# Patient Record
Sex: MALE | Race: WHITE | NOT HISPANIC OR LATINO | Employment: FULL TIME
[De-identification: names, ages, dates, MRNs, and addresses within clinical notes are randomized per-mention and may not be internally consistent; named-entity substitution may affect disease eponyms.]

---

## 2022-01-26 ENCOUNTER — TRANSCRIBE ORDERS (OUTPATIENT)
Dept: OTHER | Age: 47
End: 2022-01-26

## 2022-01-26 ENCOUNTER — TELEPHONE (OUTPATIENT)
Dept: DERMATOLOGY | Facility: CLINIC | Age: 47
End: 2022-01-26

## 2022-01-26 DIAGNOSIS — C43.9 MELANOMA OF SKIN (H): Primary | ICD-10-CM

## 2022-01-26 NOTE — TELEPHONE ENCOUNTER
Called patient to schedule consult and MOHS surgery for Melanoma on Right Mid Helix. Patient said he is going to call [employer] and see if we are in network before he schedules anything. Said he will callback today or tomorrow.    Nyasia Ocampo, Procedure

## 2022-01-31 ENCOUNTER — TELEPHONE (OUTPATIENT)
Dept: DERMATOLOGY | Facility: CLINIC | Age: 47
End: 2022-01-31
Payer: COMMERCIAL

## 2022-01-31 NOTE — TELEPHONE ENCOUNTER
Called patient to schedule consult and MOHS surgery for Melanoma on Right Mid [place]. Left voicemail to see if he had his insurance figured out yet.      Nyasia Ocampo, Procedure

## 2022-02-11 NOTE — TELEPHONE ENCOUNTER
FUTURE VISIT INFORMATION      FUTURE VISIT INFORMATION:    Date: 2.28.22    Time: 3:00    Location: Telephone  REFERRAL INFORMATION:    Referring provider:  Dr. Wayne Wilson    Referring providers clinic:  [facility]    Reason for visit/diagnosis  MOHS: Melanoma on Right Mid [place] [phone]    RECORDS REQUESTED FROM:       Clinic name Comments Records Status Photos Status   [facility] Derm 1.12.22  Dr. Wilson  Path # [id number] CE Received

## 2022-02-19 ENCOUNTER — HEALTH MAINTENANCE LETTER (OUTPATIENT)
Age: 47
End: 2022-02-19

## 2022-02-28 ENCOUNTER — PRE VISIT (OUTPATIENT)
Dept: DERMATOLOGY | Facility: CLINIC | Age: 47
End: 2022-02-28

## 2022-02-28 ENCOUNTER — VIRTUAL VISIT (OUTPATIENT)
Dept: DERMATOLOGY | Facility: CLINIC | Age: 47
End: 2022-02-28
Payer: COMMERCIAL

## 2022-02-28 DIAGNOSIS — C43.21 MALIGNANT MELANOMA OF HELIX OF RIGHT EAR (H): Primary | ICD-10-CM

## 2022-02-28 PROCEDURE — 99213 OFFICE O/P EST LOW 20 MIN: CPT | Mod: 95 | Performed by: DERMATOLOGY

## 2022-02-28 ASSESSMENT — PAIN SCALES - GENERAL: PAINLEVEL: NO PAIN (0)

## 2022-02-28 NOTE — PROGRESS NOTES
Mohs Micrographic Surgery Consult Note    Feb 28, 2022  Start time (if telephone encounter): 3:00 p.m.  End time (if telephone encounter): 3:10 p.m.    Dermatology Problem List:  1. Melanoma in situ, right mid helix, s/p shave bx 1/12/2022    Subjective: The patient is a 46 year old man who presents today for Mohs micrographic surgery consultation for a recent diagnosis of skin cancer.    Skin cancer(s): Melanoma in situ  Location(s): right mid helix  Associated symptoms: pruritus, tenderness to touch  Onset: within last 1 year    No other associated symptoms, modifying factors, or prior treatments, except when noted above. The patient denies any constitutional symptoms, lymphadenopathy, unintentional weight loss or decreased appetite. No other skin concerns today.    Objective:   Skin: No photos    Assessment and Plan:     1. Plan for Mohs micrographic surgery for skin cancer(s) above:  - We discussed the nature of the diagnosis/condition above. We discussed the treatment options, including the risks benefits and expectations of these options. We recommend micrographic surgery as the most effective and most tissue sparing option for treatment, and the patient agrees to proceed with this.  The patient is aware of the risks, benefits and expectations of this procedure. The patient will be scheduled for this procedure, if not already done so.  - We anticipate the following closure type: Sliding or lifting flap    The patient was discussed with and evaluated by attending physician, Laron Ya MD.    Deyvi Johns MD  Micrographic Surgery and Dermatologic Oncology (MSDO) Fellow    Scribe Disclosure:  I, Diogo Arias, am serving as a scribe to document services personally performed by Laron Ya MD based on data collection and the provider's statements to me. \    Attending Attestation  I attest that I discussed the case with the Fellow.  I agree with the plan.   I have reviewed the note and edited it as  necessary.    Laron Ya M.D.  Professor  Director of Dermatologic Surgery  Department of Dermatology  [facility]

## 2022-02-28 NOTE — LETTER
2/28/2022       RE: Ori Betancourt  [address]  [place] [address]     Dear Colleague,    Thank you for referring your patient, Ori Betancourt, to the [facility]ERMAT[facility] [place] [facility]. Please see a copy of my visit note below.    Mohs Micrographic Surgery Consult Note    Feb 28, 2022  Start time (if telephone encounter): 3:00 p.m.  End time (if telephone encounter): 3:10 p.m.    Dermatology Problem List:  1. Melanoma in situ, right mid helix, s/p shave bx 1/12/2022    Subjective: The patient is a 46 year old man who presents today for Mohs micrographic surgery consultation for a recent diagnosis of skin cancer.    Skin cancer(s): Melanoma in situ  Location(s): right mid helix  Associated symptoms: pruritus, tenderness to touch  Onset: within last 1 year    No other associated symptoms, modifying factors, or prior treatments, except when noted above. The patient denies any constitutional symptoms, lymphadenopathy, unintentional weight loss or decreased appetite. No other skin concerns today.    Objective:   Skin: No photos    Assessment and Plan:     1. Plan for Mohs micrographic surgery for skin cancer(s) above:  - We discussed the nature of the diagnosis/condition above. We discussed the treatment options, including the risks benefits and expectations of these options. We recommend micrographic surgery as the most effective and most tissue sparing option for treatment, and the patient agrees to proceed with this.  The patient is aware of the risks, benefits and expectations of this procedure. The patient will be scheduled for this procedure, if not already done so.  - We anticipate the following closure type: Sliding or lifting flap    The patient was discussed with and evaluated by attending physician, Laron Ya MD.    Deyvi Johns MD  Micrographic Surgery and Dermatologic Oncology (MSDO) Fellow    Scribe Disclosure:  Diogo WELCH  Juana, am serving as a scribe to document services personally performed by Laron Ya MD based on data collection and the provider's statements to me. \    Attending Attestation  I attest that I discussed the case with the Fellow.  I agree with the plan.   I have reviewed the note and edited it as necessary.    Laron Ya M.D.  Professor  Director of Dermatologic Surgery  Department of Dermatology  [facility]

## 2022-02-28 NOTE — NURSING NOTE
Chief Complaint   Patient presents with     Derm Problem     Patient consult for mohs on right mid helix.     Cira PLEITEZ CMA

## 2022-03-04 ENCOUNTER — TELEPHONE (OUTPATIENT)
Dept: DERMATOLOGY | Facility: CLINIC | Age: 47
End: 2022-03-04
Payer: COMMERCIAL

## 2022-03-07 ENCOUNTER — OFFICE VISIT (OUTPATIENT)
Dept: DERMATOLOGY | Facility: CLINIC | Age: 47
End: 2022-03-07
Attending: DERMATOLOGY
Payer: COMMERCIAL

## 2022-03-07 VITALS — HEART RATE: 63 BPM | SYSTOLIC BLOOD PRESSURE: 115 MMHG | DIASTOLIC BLOOD PRESSURE: 70 MMHG

## 2022-03-07 DIAGNOSIS — D03.21 MELANOMA IN SITU OF RIGHT EAR (H): ICD-10-CM

## 2022-03-07 PROCEDURE — 17311 MOHS 1 STAGE H/N/HF/G: CPT | Performed by: DERMATOLOGY

## 2022-03-07 PROCEDURE — 15576 PEDICLE E/N/E/L/NTRORAL: CPT | Performed by: DERMATOLOGY

## 2022-03-07 PROCEDURE — 15260 FTH/GFT FR N/E/E/L 20 SQCM/<: CPT | Performed by: DERMATOLOGY

## 2022-03-07 PROCEDURE — 17312 MOHS ADDL STAGE: CPT | Performed by: DERMATOLOGY

## 2022-03-07 ASSESSMENT — PAIN SCALES - GENERAL: PAINLEVEL: NO PAIN (0)

## 2022-03-07 NOTE — PATIENT INSTRUCTIONS
Wound care    I will experience scar, bleeding, swelling, pain, crusting and redness. I may experience incomplete removal or recurrence. Risks are bleeding, bruising, swelling, infection, nerve damage, & a large wound. A second procedure may be recommended to obtain the best cosmetic or functional result.       A three month office visit with your Surgeon is recommended for scar evaluation. Please reach out sooner if you have concerns about you surgical site/wound.    Caring for your skin after surgery    After your surgery, a pressure bandage will be placed over the area that has stitches. This is important to prevent bleeding. Please follow these instructions over the next 1 to 2 weeks. Following this regimen will help to prevent complications as your wound heals.     For the first 48 hours after your surgery:      Leave the pressure dressing on and keep it dry. If it should come loose, you may re-tape it, but do not take it off.    Relax and take it easy. Do not do any vigorous exercise or heavy lifting. This could cause the wound to bleed.    Post-Operative pain is usually mild. If you are able to take tylenol, You may take plain or extra-strength Tylenol (acetaminophen) As directed on the bottle (do not take more than 4,000mg in one day). If you are able to take ibuprofen, you can alternate the tylenol and ibuprofen.     Avoid alcohol as this may increase your tendency to bleed.     You may put an ice pack around the bandaged area for 20 minutes at a time as needed. This may help reduce swelling, bruising, and pain. Make sure the ice pack is waterproof so that the pressure bandage doesn t get wet.    If the wound is on the face try to sleep with your head elevated. Either in a recliner or propped up in bed, this will decrease swelling around the eyes.     You may see a small amount of drainage or blood on your pressure bandage. This is normal. However:  o If drainage or bleeding continues or saturates the  bandage, you will need to apply firm pressure over the bandage with a piece of gauze for 15 minutes.  o If bleeding continues after applying pressure for 15 minutes, apply an ice pack to the bandaged area for 15 minutes.  o If bleeding still continues, call our office or go to the nearest emergency room.    Remove pressure dressing 48 hours after surgery:      Carefully remove the pressure bandage. If it seems sticky or too difficult to get off, you may need to soak it off in the shower.    After the pressure dressing is removed, you may shower and get the wound wet. However, Do Not let the forceful stream of the shower hit the wound directly.    Follow these wound care and dressing change instructions:  o  In the shower was the surgical site last with its own separate was cloth.  o You may allow water to run over the site. Take a clean wash cloth wet with soapy warm water and gently pat the suture site to help remove any crust or drainage.   o Do Not rub or scrub the site    o After site is clean pat dry and apply a thin layer of Vaseline ointment  over the suture site with a cotton swab or clean finger.   o Cover the suture site with Telfa (non-stick) dressing. You may tape a piece of gauze over the Telfa for extra protection if you wish.  o Continue wound care at least once a day, twice if you are active or around a contaminated environment.  o Continue daily wound care until your surgical site is completely healed. To determine this, around 2 weeks post procedure you can take a cotton swab with a small amount of Hydrogen peroxide and roll it on the suture site. If the site bubbles and turns white your wound is still healing. Please continue wound care until the area no longer bubbles up from the hydrogen peroxide.   o Dissolving stitches, if you have been told your stitches are dissolving they should dissolve in one to one and a half week. If the stiches do not dissolve by one and a half week you can use a Qtip  with hydrogen peroxide on it and roll it along the suture line to help the stitches dissolve and come out. Please give us a call if stitches are still in after two weeks. If you do not keep your wound moist at all times while it heals the dissolving sutures will dry out and not dissolve.         Follow up will be a 3 month scar evaluation either in person or via a telephone visit with you sending in a photo via [employer]. Unless you have been told to follow up sooner or if you have concerns and would like to be see sooner. Please call or send us in a [employer] message if possible and attach a photo.        What to expect:      The first couple of days your wound may be tender and may bleed slightly when doing wound care.    There may be swelling and bruising around the wound, especially if it is near the eyes. For your comfort, you may apply ice or cold compresses to the bruises after your have removed the pressure bandage.    The area around your wound may be numb for several weeks or even months.    You may experience periodic sharp pain or mild itching around the wound as it heals.     The suture line will look dark for a while but will lighten over time.       When to call us:      You have bleeding that will not stop after applying pressure and ice.    You have pain that is not controlled with Tylenol (acetaminophen.)    You have signs or symptoms of an infection such as:  o Fever over 100 degrees Fahrenheit  o Redness, warmth or foul-smelling drainage from the wound  o If you have any questions, or are not sure how to take care of the wound.    Phone numbers:    During business hours (M-F 8:00-4:30 p.m.)  [facility]-  [phone] Option 1 appt. desk  [phone]  Option 3 nurse triage line  ---------------------------------------------------------  Evenings/Weekends/Holidays  Hospital - [phone]   TTY for hearing fzqknova-[phone]  *Ask  to page dermatologist  on-call  Emergency Aabg-[phone]  TTY for hearing impaired- [phone]

## 2022-03-07 NOTE — PROGRESS NOTES
[facility] Mohs Surgery Procedure Note    Case #: 1  Date of Service:  Mar 7, 2022  Surgery: Mohs micrographic surgery (MMS)  Staff surgeon: Laron Ya MD  Fellow surgeon: None  Resident surgeon: Ori Dominguez MD  Nurse: Cira Aleman CMA    Tumor Type: Melanoma  Location: R helix  Derm-Path Accession #: Outside path from [facility] [id number]    Mohs Accession #:   Pre-Op Size: 2 cm x 1.8 cm  Final Defect Size: 2.5 cm x 1.9 cm  Number of Mohs stages: 2  Level of Defect: Cartilage  Local anesthetic: 8 mL 1% lidocaine with epinephrine  Repair Type: Retroauricular interpolation flap with Burrow's graft  Repair Size: 4.0 x 2.5 cm  Suture Material: 4-0 Monocryl; 5-0 fast absorbing gut    Procedure:    Stage I  We discussed the principles of treatment and most likely complications including scarring, bleeding, infection, swelling, pain, crusting, nerve damage, large wound,  incomplete excision, wound dehiscence,  nerve damage, recurrence, and a second procedure may be recommended to obtain the best cosmetic or functional result.    Informed consent was obtained and the patient underwent the procedure as follows:  The patient was placed supine on the operating table.  The cancer was identified, outlined with a marker, and verified by the patient.  The entire surgical field was prepped with chlorhexidine.  The surgical site was anesthetized using lidocaine with epinephrine.    The area of clinically apparent tumor was excised and sent for permanent sections to rule out invasive melanoma. The peripheral rim of tissue was then surgically excised using a #15 blade and was then transferred onto a specimen sheet maintaining the orientation of the specimen. Hemostasis was obtained using bipolar electrocoagulation. The wound site was then covered with a dressing while the tissue samples were processed for examination.    The excised tissue was transported to the [facility] histology laboratory  maintaining the tissue orientation.  The tissue specimen was relaxed so that the entire surgical margin was in a a single horizontal plane for sectioning and inked for precise mapping.  A precise reference map was drawn to reflect the sectioning of the specimen, colored inking of the margins, and orientation on the patient. The tissue was processed using horizontal sectioning of the base and continuous peripheral margins. Vertical, bread loafed sections were obtained from the central portion of the lesion, prior to being sent for permament sections. A control biopsy at the R preauricular cheek was taken to compare the density and periodicity of melanocytes along the dermal-epidermal junction.     The tissue sections were stained with Hematoxylin and Eosin (H&E), as well as Melanoma antigen recognized by T cells or Melan-A (MART-1) stain. The histopathologic sections were reviewed in conjunction with the reference map.     Total blocks: 3   Total slides:  13    Within the central portion of the lesion, there was increased density and periodicity of atypical-appearing melanocytes with areas of confluence at the epidermis, consistent with diagnosis above.    Was the skin lesion clear at this stage?: No, the skin lesion was determined not clear at this stage. There was increased density and periodicity of atypical-appearing melanocytes with areas of confluence at the epidermis. Additional stage(s) performed (see below).    Stage II   The patient was returned to the operating room, and the area prepped in the usual manner. The residual tumor was excised using the reference map as a guide. The specimen was transfered to a labeled specimen sheet maintaining the orientation of the specimen. Hemostasis was obtained and the wound site was covered with a dressing while the tissue was processed for examination.     The excised tissue was transported to the Mohs histology laboratory maintaining orientation. The specimen margins  were inked for precise mapping and a reference map was prepared for the is additional stage to maintain precise orientation as described above. The tissue was processed using horizontal sectioning of the base and continuous peripheral margins as in prior stage. The histopathologic sections were reviewed in conjunction with the reference map.     Total blocks: 1  Total slides: 6    Was the skin lesion clear at this stage?: Yes, the skin lesion was determined clear at periphery at this stage: There was a relatively normal periodicity and density of melanocytes along the dermal-epidermal junction noted at the peripheral margins, therefore Mohs surgery was complete.    RECONSTRUCTION: Retroauricular Interpolation Flap With Burrow's graft    The patient was taken to the operative suite and placed supine on the operating room table.  The wound on the ear was identified and the planned reconstruction was outlined with a marker.  The area was then infiltrated with 1% lidocaine with epinephrine.  The area was then prepped in a sterile fashion using chlorhexidine and rinsed with sterile saline and sterile drapes were placed.  The scalp to ear flap was incised with a #15 scalpel, down to the level of fascia, from the post auricular and mastoid scalp skin. Hemostasis was obtained with electrocoagulation.  The flap was then advanced into the defect and secured with 4-0 Monocryl deep sutures.  The wound edges were then carefully approximated using 5-0 fast absorbing gut epidermal sutures.  A residual area of defect on the antihelix was addressed via a Burrow's graft harvested the inferior SCD of the interpolation flap to avoid excess flap tension and to recreate the thin skinned nature of the antihelix.  The SCD was thinned, trimmed to fit, and carefully sutured into place with 5.0 FAG.  It measured 2.5x1 cm.  The wound was cleansed with saline and ointment was applied.  A Telfa pressure dressing was placed.  The patient will  return in about 3 weeks for pedicle takedown, including division and inset.  Wound care was reviewed verbally and in writing.  The patient left the operative suite in stable condition.     Laron Ya MD was immediately available for the entire surgery and was physicially present for the key portions of the procedure.    Dr. Ya performed the micrographic surgery and the reconstruction/repair and was present for the entire micrographic surgery and always immediately available.    Scribe Disclosure:  I, Kadi Sumner, am serving as a scribe to document services personally performed by Laron Ya MD based on data collection and the provider's statements to me.     Attending attestation:  I personally performed the entire procedure.  I have reviewed the note and edited it as necessary, and agree with its contents.    Laron Ya M.D.  Professor  Director of Dermatologic Surgery  Department of Dermatology  [facility]    Dermatology Surgery Clinic  [facility] [place] [phone]

## 2022-03-07 NOTE — NURSING NOTE
Dermatology Rooming Note    Ori Betancourt's goals for this visit include:   Chief Complaint   Patient presents with     Derm Problem     Michel is here today for MOHS on the right helix due to melanoma     Vivi Pelayo, CMA

## 2022-03-08 PROCEDURE — 88305 TISSUE EXAM BY PATHOLOGIST: CPT | Mod: TC | Performed by: DERMATOLOGY

## 2022-03-08 PROCEDURE — 88305 TISSUE EXAM BY PATHOLOGIST: CPT | Mod: 26 | Performed by: DERMATOLOGY

## 2022-03-09 LAB
PATH REPORT.COMMENTS IMP SPEC: NORMAL
PATH REPORT.FINAL DX SPEC: NORMAL
PATH REPORT.GROSS SPEC: NORMAL
PATH REPORT.MICROSCOPIC SPEC OTHER STN: NORMAL
PATH REPORT.RELEVANT HX SPEC: NORMAL

## 2022-03-10 ENCOUNTER — HOSPITAL ENCOUNTER (EMERGENCY)
Facility: CLINIC | Age: 47
Discharge: HOME | End: 2022-03-10
Attending: EMERGENCY MEDICINE | Admitting: EMERGENCY MEDICINE
Payer: COMMERCIAL

## 2022-03-10 VITALS
TEMPERATURE: 98.5 F | RESPIRATION RATE: 16 BRPM | OXYGEN SATURATION: 100 % | SYSTOLIC BLOOD PRESSURE: 134 MMHG | HEART RATE: 64 BPM | DIASTOLIC BLOOD PRESSURE: 79 MMHG

## 2022-03-10 DIAGNOSIS — G89.18 POSTOPERATIVE PAIN: ICD-10-CM

## 2022-03-10 PROCEDURE — 99283 EMERGENCY DEPT VISIT LOW MDM: CPT

## 2022-03-10 RX ORDER — OXYCODONE HYDROCHLORIDE 5 MG/1
5 TABLET ORAL EVERY 6 HOURS PRN
Qty: 10 TABLET | Refills: 0 | Status: SHIPPED | OUTPATIENT
Start: 2022-03-10

## 2022-03-10 RX ORDER — CEPHALEXIN 500 MG/1
500 CAPSULE ORAL 4 TIMES DAILY
Qty: 28 CAPSULE | Refills: 0 | Status: SHIPPED | OUTPATIENT
Start: 2022-03-10 | End: 2022-03-17

## 2022-03-10 ASSESSMENT — ENCOUNTER SYMPTOMS
CHILLS: 0
WOUND: 1
FEVER: 0

## 2022-03-11 NOTE — ED TRIAGE NOTES
Mohs surgery on right ear on Monday. Left dressing on 48h as instructed - did wound care as instructed. Now changed dressing to right ear today and noticed white spots and odor. Pain on relieved with tylenol and ibuprofen - last taken at 1600.

## 2022-03-11 NOTE — ED PROVIDER NOTES
"  History     Chief Complaint:  Wound Check       HPI   Ori Betancourt is a 46 year old male who presents with concerns for ongoing right ear pain since his Mohs surgery on 3/7/2022.  He notes he has had ongoing severe pain described as burning since his surgery.  It is not significantly worsened.  Is not significantly improved with ibuprofen or Tylenol.  He and his significant other note that the area has also been red, but is not sure if it has progressed in redness or not.  His significant other was worried when she noticed some \"whitish spots\" on the area of surgery.  She also noted that there was a foul odor from the wound.  The patient denies fever or chills.  He denies any other concerns.  They did try to reach out to the dermatologist today but were unable to get through.    ROS:  Review of Systems   Constitutional: Negative for chills and fever.   HENT: Positive for ear pain.         Positive for right ear redness   Skin: Positive for wound.        Positive for redness at the surgical site.   Neurological: Headaches: chronic.       Allergies:  No Known Allergies     Medications:    No daily medications noted.    Past Medical History:    Chronic daily headaches  Ganglionic cyst    Past Surgical History:    Mohs surgery right ear    Social History:   reports that he has never smoked. He has never used smokeless tobacco.  He is here with his wife PCP: Sean Sosa     Physical Exam   Patient Vitals for the past 24 hrs:   BP Temp Temp src Pulse Resp SpO2   03/10/22 2202 134/79 98.5  F (36.9  C) Temporal 64 16 100 %        Physical Exam  General: Adult male, ambulatory in the room  ENT: Surgical wound over the right posterior external ear with mild surrounding erythema.  Mild whitish material at the surgical site that appears to be granulation tissue.  No purulent discharge.  No follow odor.  No palpable fluctuance.  Nontender on palpation of the external ear surrounding the surgical site.  Skin: External ear " exam as above.  No spreading erythema to the face or scalp noted  Neuro: Alert and oriented. Responds appropriately to all questions and commands.  Psych: Normal mood and affect. Pleasant.    Emergency Department Course       Emergency Department Course:    Reviewed:  I reviewed nursing notes, vitals and past medical history    Assessments:   I obtained history and examined the patient as noted above.     Disposition:  The patient was discharged to home.     Impression & Plan      Covid-19  Ori Betancourt was evaluated during a global COVID-19 pandemic, which necessitated consideration that the patient might be at risk for infection with the SARS-CoV-2 virus that causes COVID-19.   Applicable protocols for evaluation were followed during the patient's care.     Medical Decision Making:  Ori Betancourt is a 46-year-old male status post Mohs surgery in his right ear 3/7/2022 who presents emergency department concerns for ongoing pain, redness and concern for possible abnormal appearance of the wound healing site.  There is no definitive evidence of infection at this time.  The pain is not significantly increased but has been persistent since the surgery.  It is not clear that there has been worsening erythema, either.  He has no fever.  The area of concern over the wound bed appears to be granulation tissue rather than purulent discharge/infection.  The patient is aware that he may be early in opportunistic skin infection and therefore should continue to take serial pictures of the area and call his dermatologist tomorrow to discuss further care.  One of his primary concerns was for pain control and he will be discharged home with a limited number of oxycodone after discussion of risks and side effects.  He is discharged home with prescription for Keflex which he may initiate if redness spreads or he develops any other concerns for infection, or if is recommended to use it as per his dermatologist.  He understands that he  will return if symptoms worsen.  He felt comfortable this plan.  All questions were answered prior to discharge.      Diagnosis:    ICD-10-CM    1. Postoperative pain  G89.18     right ear        Discharge Medications:  New Prescriptions    CEPHALEXIN (KEFLEX) 500 MG CAPSULE    Take 1 capsule (500 mg) by mouth 4 times daily for 7 days    OXYCODONE (ROXICODONE) 5 MG TABLET    Take 1 tablet (5 mg) by mouth every 6 hours as needed for severe pain        3/10/2022   Nati Phillips MD Jonkman, Tracy Dianne, MD  03/10/22 9390

## 2022-03-11 NOTE — DISCHARGE INSTRUCTIONS
The redness and pain you are experiencing sound as though they have been there since the surgery, however, it is difficult to exclude early infection.  Therefore, if the redness spreads or pain increases, take the antibiotic as prescribed.  Also touch base with your dermatologist tomorrow to discuss further reassessment and plan

## 2022-04-01 NOTE — PROGRESS NOTES
[facility] Dermatologic Surgery Report     CHIEF COMPLAINT: Flap Take Down  Mr. Betancourt is a 46 year old male who is here for flap takedown of the right ear helix.  He had Mohs excision of a large Melanoma about 4 weeks ago. The defect was reconstructed using a retroauricular interpolation flap technique. At this time, the flap was divided at the pedicle and the helical rim was sutured in place.    Name of Procedure: Post Auricular Interpolation Flap Take Down   Surgeon: Dr. Ya  Preoperative Diagnosis: S/P Mohs excision of Melanoma of the R ear   Postoperative Diagnosis: Same   INDICATIONS: This patient presents for the second stage helix repair following removal of a Melanoma of theR ear. This was originally repaired with an interpolation advancement flap. We discussed the principles of treatment and possible complications including bleeding, infection, wound dehissence and scarring. Having understood this, the patient gave informed consent to the following surgery.     REPAIR: The patient was taken to the operative suite. The treatment area was anesthetized with 3 ml of 1% Lidocaine and epinephrine. The area was washed with Hibiclens, rinsed with saline and draped with sterile towels. The flap was severed at the base. The flap was then reapproximated to cover the R ear.  Epidermal approximation was obtained using 5-0 chromic gut simple running sutures. Two standing cones were removed by triangulation. The final wound measured 3.2 cm. Estimated blood loss was less than 10 ml. A sterile pressure dressing was applied, post-operative wound care instructions, with a written handout, were given. The patient was discharged from the [facility] alert and ambulatory.    Scribe Disclosure:  I, Kadi Sumner, am serving as a scribe to document services personally performed by Laron Ya MD based on data collection and the provider's statements to me.

## 2022-04-04 ENCOUNTER — OFFICE VISIT (OUTPATIENT)
Dept: DERMATOLOGY | Facility: CLINIC | Age: 47
End: 2022-04-04
Payer: COMMERCIAL

## 2022-04-04 VITALS — DIASTOLIC BLOOD PRESSURE: 87 MMHG | SYSTOLIC BLOOD PRESSURE: 118 MMHG | HEART RATE: 61 BPM

## 2022-04-04 DIAGNOSIS — G89.18 POST-OP PAIN: ICD-10-CM

## 2022-04-04 DIAGNOSIS — D03.21 MELANOMA IN SITU OF RIGHT EAR (H): Primary | ICD-10-CM

## 2022-04-04 PROCEDURE — 15630 DELAY FLAP EYE/NOS/EAR/LIP: CPT | Mod: 58 | Performed by: DERMATOLOGY

## 2022-04-04 RX ORDER — TRAMADOL HYDROCHLORIDE 50 MG/1
50 TABLET ORAL EVERY 6 HOURS PRN
Qty: 10 TABLET | Refills: 0 | Status: SHIPPED | OUTPATIENT
Start: 2022-04-04 | End: 2022-04-07

## 2022-04-04 ASSESSMENT — PAIN SCALES - GENERAL: PAINLEVEL: NO PAIN (0)

## 2022-04-04 NOTE — NURSING NOTE
Dermatology Rooming Note    Ori Betancourt's goals for this visit include:   Chief Complaint   Patient presents with     Derm Problem     Michel is here today for the right ear take down     Vivi Pelayo, CMA

## 2022-04-04 NOTE — LETTER
4/4/2022       RE: Ori Betancourt  [address]  [place] [address]     Dear Colleague,    Thank you for referring your patient, Ori Betancourt, to the [facility] DERMATOLOGIC SURGERY CLINIC [place] at [facility]. Please see a copy of my visit note below.    [facility] Dermatologic Surgery Report     CHIEF COMPLAINT: Flap Take Down  Mr. Betancourt is a 46 year old male who is here for flap takedown of the right ear helix.  He had Mohs excision of a large Melanoma about 4 weeks ago. The defect was reconstructed using a retroauricular interpolation flap technique. At this time, the flap was divided at the pedicle and the helical rim was sutured in place.    Name of Procedure: Post Auricular Interpolation Flap Take Down   Surgeon: Dr. Ya  Preoperative Diagnosis: S/P Mohs excision of Melanoma of the R ear   Postoperative Diagnosis: Same   INDICATIONS: This patient presents for the second stage helix repair following removal of a Melanoma of theR ear. This was originally repaired with an interpolation advancement flap. We discussed the principles of treatment and possible complications including bleeding, infection, wound dehissence and scarring. Having understood this, the patient gave informed consent to the following surgery.     REPAIR: The patient was taken to the operative suite. The treatment area was anesthetized with 3 ml of 1% Lidocaine and epinephrine. The area was washed with Hibiclens, rinsed with saline and draped with sterile towels. The flap was severed at the base. The flap was then reapproximated to cover the R ear.  Epidermal approximation was obtained using 5-0 chromic gut simple running sutures. Two standing cones were removed by triangulation. The final wound measured 3.2 cm. Estimated blood loss was less than 10 ml. A sterile pressure dressing was applied, post-operative wound care instructions, with a written handout, were  given. The patient was discharged from the [facility] Surgery Center alert and ambulatory.    Scribe Disclosure:  I, Kadi Sumner, am serving as a scribe to document services personally performed by Laron Ya MD based on data collection and the provider's statements to me.     Attestation signed by Laron Ya MD at 4/4/2022  4:08 PM:    Attending attestation:  I was present for key elements of the procedure and immediately available for all other portions of the procedure.  I have reviewed the note and edited it as necessary.    Laron Ya M.D.  Professor  Director of Dermatologic Surgery  Department of Dermatology  [facility]    Dermatology Surgery Clinic  [facility]  [address] [place], [place] [id number]

## 2022-04-04 NOTE — PATIENT INSTRUCTIONS
Excision/Mohs Wound Care Instructions  I will experience scar, altered skin color, bleeding, swelling, pain, crusting and redness. I may experience altered sensation. Risks are excessive bleeding, infection, muscle weakness, thick (hypertrophic or keloidal) scar, and recurrence. A second procedure may be recommended to obtain the best cosmetic or functional result.  Possible complications of any surgical procedure are bleeding, infection, scarring, alteration in skin color and sensation, muscle weakness in the area, wound dehiscence or seperation, or recurrence of the lesion or disease. On occasion, after healing, a secondary procedure or revision may be recommended in order to obtain the best cosmetic or functional result.   After your surgery, a pressure bandage will be placed over the area that has sutures. This will help prevent bleeding. Please follow these instructions until you come back to clinic for suture removal on NA, as they will help you to prevent complications as your wound heals.  For the First 48 hours After Surgery:  1. Leave the pressure bandage on and keep it dry. If it should come loose, you may retape it, but do not take it off.  2. Relax and take it easy. Do not do any vigorous exercise, heavy lifting, or bending forward. This could cause the wound to bleed.  3. Post-operative pain is usually mild. You may take plain or extra strength Tylenol every 4 hours as needed (do not take more than 4,000mg in one day). Do not take any medicine that contains aspirin, ibuprofen or motrin unless you have been recommended these by a doctor.  Avoid alcohol and vitamin E as these may increase your tendency to bleed.  4. You may put an ice pack around the bandaged area for 20 minutes every 2-3 hours. This may help reduce swelling, bruising, and pain. Make sure the ice pack is waterproof so that the pressure bandage does not get wet.   5. You may see a small amount of drainage or blood on your pressure  bandage. This is normal. However, if drainage or bleeding continues or saturates the bandage, you will need to apply firm pressure over the bandage with a washcloth for 15 minutes. If bleeding continues after applying pressure for 15 minutes then go to the nearest emergency room.  48 Hours After Surgery  Carefully remove the bandage and start daily wound care and dressing changes. You may also now shower and get the wound wet.  Daily Wound Care:  1. Wash wound with a mild soap and water.  Use caution when washing the wound, be gentle and do not let the forceful shower stream hit the wound directly.  2. Pat dry.  3. Apply Vaseline (from a new container or tube) over the suture line with a Q-tip. It is very important to keep the wound continuously moist, as wounds heal best in a moist environment.  4. Keep the site covered until sutures are removed, you can cover it with a Telfa (non-stick) dressing and tape or a band-aid.    5. If you are unable to keep wound covered, you must apply Vaseline every 2-3 hours (while awake) to ensure it is being kept moist for optimal healing. A dressing overnight is recommended to keep the area moist.  Call Us If:  1. You have pain that is not controlled with Tylenol.  2. You have signs or symptoms of an infection, such as: fever over 100 degrees F, redness, warmth, or foul-smelling or yellow/creamy drainage from the wound.  Who should I call with questions?    [facility]: [phone]     [facility]: [phone]    For urgent needs outside of business hours call the [facility] at [phone] and ask to speak with the dermatology resident on call

## 2022-10-22 ENCOUNTER — HEALTH MAINTENANCE LETTER (OUTPATIENT)
Age: 47
End: 2022-10-22

## 2023-04-01 ENCOUNTER — HEALTH MAINTENANCE LETTER (OUTPATIENT)
Age: 48
End: 2023-04-01

## 2023-06-14 ENCOUNTER — HOSPITAL ENCOUNTER (EMERGENCY)
Facility: CLINIC | Age: 48
Discharge: HOME | End: 2023-06-14
Attending: EMERGENCY MEDICINE | Admitting: EMERGENCY MEDICINE
Payer: COMMERCIAL

## 2023-06-14 ENCOUNTER — APPOINTMENT (OUTPATIENT)
Dept: ULTRASOUND IMAGING | Facility: CLINIC | Age: 48
End: 2023-06-14
Attending: EMERGENCY MEDICINE
Payer: COMMERCIAL

## 2023-06-14 VITALS
TEMPERATURE: 98.9 F | OXYGEN SATURATION: 99 % | WEIGHT: 165 LBS | SYSTOLIC BLOOD PRESSURE: 131 MMHG | HEIGHT: 69 IN | HEART RATE: 86 BPM | RESPIRATION RATE: 18 BRPM | BODY MASS INDEX: 24.44 KG/M2 | DIASTOLIC BLOOD PRESSURE: 92 MMHG

## 2023-06-14 DIAGNOSIS — N50.812 PAIN IN LEFT TESTICLE: ICD-10-CM

## 2023-06-14 DIAGNOSIS — I86.1 VARICOCELE: ICD-10-CM

## 2023-06-14 LAB
ALBUMIN UR-MCNC: NEGATIVE MG/DL
APPEARANCE UR: CLEAR
BILIRUB UR QL STRIP: NEGATIVE
COLOR UR AUTO: NORMAL
GLUCOSE UR STRIP-MCNC: NEGATIVE MG/DL
HGB UR QL STRIP: NEGATIVE
KETONES UR STRIP-MCNC: NEGATIVE MG/DL
LEUKOCYTE ESTERASE UR QL STRIP: NEGATIVE
NITRATE UR QL: NEGATIVE
PH UR STRIP: 5.5 [PH] (ref 5–7)
RBC URINE: 1 /HPF
SP GR UR STRIP: 1.02 (ref 1–1.03)
UROBILINOGEN UR STRIP-MCNC: NORMAL MG/DL
WBC URINE: 1 /HPF

## 2023-06-14 PROCEDURE — 99284 EMERGENCY DEPT VISIT MOD MDM: CPT | Mod: 25

## 2023-06-14 PROCEDURE — 81001 URINALYSIS AUTO W/SCOPE: CPT | Performed by: EMERGENCY MEDICINE

## 2023-06-14 PROCEDURE — 76870 US EXAM SCROTUM: CPT

## 2023-06-14 RX ORDER — LEVOFLOXACIN 500 MG/1
500 TABLET, FILM COATED ORAL DAILY
Qty: 10 TABLET | Refills: 0 | Status: SHIPPED | OUTPATIENT
Start: 2023-06-14 | End: 2023-06-24

## 2023-06-14 NOTE — ED TRIAGE NOTES
Pt presents for evaluation of testicular/groin pain. Had prostate issues in December. Pt was told to have a colonoscopy, which he did. Was told he had an enlarged prostate at that time. Was seen last week by urology, but nothing done in regards to the current pain. Today, pt woke up in more severe pain. Pain worse on the left, feels like it isn't the testicle or scrotum, but the tissue in between. Denies urinary issues.

## 2023-06-14 NOTE — ED PROVIDER NOTES
"   History     Chief Complaint:  Testicular/scrotal Pain       HPI   Ori Betancourt is a 48 year old male who presents to the ED with testicular and scrotal pain which has been going on for approximately 6 months, but got a lot worse today. The patient located the pain as \"the material around the testicles\" and rated the pain as a 7/10 in severity. He states, \"the actual ball doesn't hurt.\" The patient denies any trauma, hematuria, unusual urinary drainage or symptoms, and abdominal pain.    Of note, the patient saw a urologist on June 6 because of prostate pain and had a prostate exam done which revealed an enlarged prostate. He had a colonoscopy done in March which resulted in the removal of 6 polyps.    Independent Historian:   None - Patient Only    Review of External Notes:   I reviewed the urology note on June 6th.       Medications:    Roxicodone    Past Medical History:    BPH    Past Surgical History:    Colonoscopy     Physical Exam     Patient Vitals for the past 24 hrs:   BP Temp Temp src Pulse Resp SpO2 Height Weight   06/14/23 1447 (!) 131/92 98.9  F (37.2  C) Temporal 86 18 99 % 1.753 m (5' 9\") 74.8 kg (165 lb)        Physical Exam  General: Patient is alert and cooperative.  HENT:  Normal appearance.   Eyes: EOMI. Normal conjunctiva.  Neck:  Normal range of motion and appearance.   Cardiovascular:  Normal rate.  Pulmonary/Chest:  Effort normal.   Abdominal: no tenderness.   Genitourinary: Circumcised male.  No urethral drainage.  No scrotal mass or hernia.  No palpable abnormality.  There appears to be tenderness in the left additional region.  No overlying skin changes to the scrotum.  Musculoskeletal: Normal range of motion. No edema or tenderness.   Neurological: oriented, normal strength, sensation, and coordination.   Skin: Warm and dry. No rash or bruising.   Psychiatric: Normal mood and affect. Normal behavior and judgement.    Emergency Department Course     Imaging:  US Testicular & Scrotum w " Doppler Ltd   Final Result   IMPRESSION:    1. Left scrotal varicocele.   2. 5 mm right epididymal head cyst.       MISAEL COBURN MD            SYSTEM ID:  [id number]         Report per radiology    Laboratory:  Labs Ordered and Resulted from Time of ED Arrival to Time of ED Departure   ROUTINE UA WITH MICROSCOPIC REFLEX TO CULTURE - Normal       Result Value    Color Urine Light Yellow      Appearance Urine Clear      Glucose Urine Negative      Bilirubin Urine Negative      Ketones Urine Negative      Specific Gravity Urine 1.022      Blood Urine Negative      pH Urine 5.5      Protein Albumin Urine Negative      Urobilinogen Urine Normal      Nitrite Urine Negative      Leukocyte Esterase Urine Negative      RBC Urine 1      WBC Urine 1          Emergency Department Course & Assessments:    Interventions:  Medications - No data to display     Assessments:  1615 I obtained history and examined the patient as noted above.    Independent Interpretation (X-rays, CTs, rhythm strip):  None    Consultations/Discussion of Management or Tests:  None     Social Determinants of Health affecting care:   None    Disposition:  The patient was discharged to home.     Impression & Plan      Medical Decision Making:  Well-appearing 48-year-old male with subacute but at times worsening atraumatic mainly left scrotal discomfort.  Physical exam is notable for some mild tenderness not of the testicle itself but rather it seems to be of the epididymis.  Urinalysis is negative.  Testicular ultrasound does show a left scrotal varicocele.  There is no evidence of epididymitis or orchitis.  These findings were discussed with him.  Could be related to the varicocele and I recommended follow-up with urology for consultation.  Given his location of tenderness, even though his ultrasound and UA are negative, I did suggest covering him for possible epididymitis and he was prescribed a course of levofloxacin.  If not gradually improving,  this is again a reason to follow-up with urology.    Diagnosis:    ICD-10-CM    1. Varicocele  I86.1       2. Pain in left testicle  N50.812            Discharge Medications:  New Prescriptions    LEVOFLOXACIN (LEVAQUIN) 500 MG TABLET    Take 1 tablet (500 mg) by mouth daily for 10 doses          Scribe Disclosure:  Memo WELCH & Danyel Nair, am serving as a scribe at 4:29 PM on 6/14/2023 to document services personally performed by Tommy Latham MD based on my observations and the provider's statements to me.       Tommy Latham MD  06/16/23 6860

## 2024-03-24 ENCOUNTER — HEALTH MAINTENANCE LETTER (OUTPATIENT)
Age: 49
End: 2024-03-24